# Patient Record
Sex: MALE | Race: WHITE | NOT HISPANIC OR LATINO
[De-identification: names, ages, dates, MRNs, and addresses within clinical notes are randomized per-mention and may not be internally consistent; named-entity substitution may affect disease eponyms.]

---

## 2017-03-07 PROBLEM — Z00.00 ENCOUNTER FOR PREVENTIVE HEALTH EXAMINATION: Noted: 2017-03-07

## 2017-03-08 ENCOUNTER — APPOINTMENT (OUTPATIENT)
Dept: OPHTHALMOLOGY | Facility: CLINIC | Age: 72
End: 2017-03-08

## 2017-03-08 ENCOUNTER — OUTPATIENT (OUTPATIENT)
Dept: OUTPATIENT SERVICES | Facility: HOSPITAL | Age: 72
LOS: 1 days | End: 2017-03-08

## 2017-03-09 DIAGNOSIS — H26.492 OTHER SECONDARY CATARACT, LEFT EYE: ICD-10-CM

## 2018-07-30 ENCOUNTER — APPOINTMENT (OUTPATIENT)
Dept: NEUROLOGY | Facility: CLINIC | Age: 73
End: 2018-07-30
Payer: MEDICARE

## 2018-07-30 VITALS
HEIGHT: 72 IN | BODY MASS INDEX: 25.47 KG/M2 | DIASTOLIC BLOOD PRESSURE: 60 MMHG | SYSTOLIC BLOOD PRESSURE: 124 MMHG | WEIGHT: 188 LBS

## 2018-07-30 PROCEDURE — 99205 OFFICE O/P NEW HI 60 MIN: CPT

## 2018-07-31 ENCOUNTER — APPOINTMENT (OUTPATIENT)
Dept: NEUROLOGY | Facility: CLINIC | Age: 73
End: 2018-07-31
Payer: MEDICARE

## 2018-07-31 PROCEDURE — 93040 RHYTHM ECG WITH REPORT: CPT

## 2018-07-31 PROCEDURE — 95819 EEG AWAKE AND ASLEEP: CPT

## 2018-09-20 ENCOUNTER — APPOINTMENT (OUTPATIENT)
Dept: NEUROLOGY | Facility: CLINIC | Age: 73
End: 2018-09-20
Payer: MEDICARE

## 2018-09-20 VITALS
DIASTOLIC BLOOD PRESSURE: 60 MMHG | SYSTOLIC BLOOD PRESSURE: 120 MMHG | WEIGHT: 190 LBS | HEIGHT: 72 IN | BODY MASS INDEX: 25.73 KG/M2

## 2018-09-20 PROCEDURE — 99215 OFFICE O/P EST HI 40 MIN: CPT

## 2018-11-29 ENCOUNTER — APPOINTMENT (OUTPATIENT)
Dept: NEUROLOGY | Facility: CLINIC | Age: 73
End: 2018-11-29
Payer: MEDICARE

## 2018-11-29 ENCOUNTER — TRANSCRIPTION ENCOUNTER (OUTPATIENT)
Age: 73
End: 2018-11-29

## 2018-11-29 VITALS
WEIGHT: 190 LBS | BODY MASS INDEX: 25.73 KG/M2 | SYSTOLIC BLOOD PRESSURE: 120 MMHG | DIASTOLIC BLOOD PRESSURE: 62 MMHG | HEIGHT: 72 IN

## 2018-11-29 PROCEDURE — 99214 OFFICE O/P EST MOD 30 MIN: CPT

## 2019-02-20 ENCOUNTER — APPOINTMENT (OUTPATIENT)
Dept: NEUROLOGY | Facility: CLINIC | Age: 74
End: 2019-02-20
Payer: MEDICARE

## 2019-02-20 VITALS
BODY MASS INDEX: 25.73 KG/M2 | SYSTOLIC BLOOD PRESSURE: 120 MMHG | WEIGHT: 190 LBS | DIASTOLIC BLOOD PRESSURE: 78 MMHG | HEIGHT: 72 IN

## 2019-02-20 DIAGNOSIS — I10 ESSENTIAL (PRIMARY) HYPERTENSION: ICD-10-CM

## 2019-02-20 PROCEDURE — 99214 OFFICE O/P EST MOD 30 MIN: CPT

## 2019-03-03 ENCOUNTER — TRANSCRIPTION ENCOUNTER (OUTPATIENT)
Age: 74
End: 2019-03-03

## 2019-06-05 ENCOUNTER — APPOINTMENT (OUTPATIENT)
Dept: NEUROLOGY | Facility: CLINIC | Age: 74
End: 2019-06-05
Payer: MEDICARE

## 2019-06-05 VITALS
WEIGHT: 190 LBS | HEIGHT: 72 IN | SYSTOLIC BLOOD PRESSURE: 120 MMHG | DIASTOLIC BLOOD PRESSURE: 70 MMHG | BODY MASS INDEX: 25.73 KG/M2

## 2019-06-05 PROCEDURE — 99214 OFFICE O/P EST MOD 30 MIN: CPT

## 2019-06-05 RX ORDER — MONTELUKAST 10 MG/1
10 TABLET, FILM COATED ORAL
Qty: 30 | Refills: 0 | Status: ACTIVE | COMMUNITY
Start: 2019-02-14

## 2019-07-23 ENCOUNTER — APPOINTMENT (OUTPATIENT)
Dept: NEUROLOGY | Facility: CLINIC | Age: 74
End: 2019-07-23
Payer: MEDICARE

## 2019-07-23 VITALS
BODY MASS INDEX: 26.41 KG/M2 | SYSTOLIC BLOOD PRESSURE: 140 MMHG | WEIGHT: 195 LBS | HEIGHT: 72 IN | DIASTOLIC BLOOD PRESSURE: 80 MMHG

## 2019-07-23 PROCEDURE — 99215 OFFICE O/P EST HI 40 MIN: CPT

## 2019-07-23 RX ORDER — ASPRIN AND EXTENDED-RELEASE DIPYRIDAMOLE 25; 200 MG/1; MG/1
25-200 CAPSULE ORAL TWICE DAILY
Qty: 60 | Refills: 5 | Status: DISCONTINUED | COMMUNITY
Start: 2018-07-30 | End: 2019-07-23

## 2019-08-19 ENCOUNTER — APPOINTMENT (OUTPATIENT)
Dept: NEUROLOGY | Facility: CLINIC | Age: 74
End: 2019-08-19
Payer: MEDICARE

## 2019-08-19 VITALS
BODY MASS INDEX: 26.41 KG/M2 | DIASTOLIC BLOOD PRESSURE: 70 MMHG | HEIGHT: 72 IN | SYSTOLIC BLOOD PRESSURE: 140 MMHG | WEIGHT: 195 LBS

## 2019-08-19 PROCEDURE — 99214 OFFICE O/P EST MOD 30 MIN: CPT

## 2019-11-14 ENCOUNTER — APPOINTMENT (OUTPATIENT)
Dept: NEUROLOGY | Facility: CLINIC | Age: 74
End: 2019-11-14
Payer: MEDICARE

## 2019-11-14 VITALS
HEART RATE: 67 BPM | BODY MASS INDEX: 27.36 KG/M2 | WEIGHT: 202 LBS | HEIGHT: 72 IN | DIASTOLIC BLOOD PRESSURE: 68 MMHG | SYSTOLIC BLOOD PRESSURE: 132 MMHG

## 2019-11-14 PROCEDURE — 93886 INTRACRANIAL COMPLETE STUDY: CPT

## 2019-11-14 PROCEDURE — 93892 TCD EMBOLI DETECT W/O INJ: CPT

## 2019-11-14 PROCEDURE — 93880 EXTRACRANIAL BILAT STUDY: CPT

## 2019-11-14 PROCEDURE — 99215 OFFICE O/P EST HI 40 MIN: CPT

## 2019-11-14 RX ORDER — AMLODIPINE BESYLATE 2.5 MG/1
2.5 TABLET ORAL
Qty: 90 | Refills: 0 | Status: DISCONTINUED | COMMUNITY
Start: 2018-12-13 | End: 2019-11-14

## 2019-11-14 RX ORDER — ATORVASTATIN CALCIUM 20 MG/1
20 TABLET, FILM COATED ORAL
Qty: 90 | Refills: 0 | Status: DISCONTINUED | COMMUNITY
Start: 2018-06-06 | End: 2019-11-14

## 2019-11-14 NOTE — REASON FOR VISIT
[Follow-Up: _____] : a [unfilled] follow-up visit [Consultation] : a consultation visit [FreeTextEntry1] : Chief complaint:recurrent cerebellar stroke

## 2019-11-14 NOTE — ASSESSMENT
[FreeTextEntry1] : Neurologically normal. Unfortunately he had another small cerebellar stroke which is now his third. He will remain on aspirin 325 mg and Plavix 75 mg daily. Followup in 3 months and by phone prior to that if needed. Lengthy discussion held with the patient and his wife.

## 2019-11-14 NOTE — PHYSICAL EXAM
[General Appearance - Alert] : alert [General Appearance - In No Acute Distress] : in no acute distress [Oriented To Time, Place, And Person] : oriented to person, place, and time [General Appearance - Well-Appearing] : healthy appearing [Impaired Insight] : insight and judgment were intact [Affect] : the affect was normal [Person] : oriented to person [Memory Recent] : recent memory was not impaired [Place] : oriented to place [Concentration Intact] : normal concentrating ability [Time] : oriented to time [Fluency] : fluency intact [Comprehension] : comprehension intact [Cranial Nerves Optic (II)] : visual acuity intact bilaterally,  visual fields full to confrontation, pupils equal round and reactive to light [Cranial Nerves Oculomotor (III)] : extraocular motion intact [Cranial Nerves Trigeminal (V)] : facial sensation intact symmetrically [Cranial Nerves Vestibulocochlear (VIII)] : hearing was intact bilaterally [Cranial Nerves Facial (VII)] : face symmetrical [Cranial Nerves Glossopharyngeal (IX)] : tongue and palate midline [Cranial Nerves Accessory (XI - Cranial And Spinal)] : head turning and shoulder shrug symmetric [Motor Tone] : muscle tone was normal in all four extremities [Cranial Nerves Hypoglossal (XII)] : there was no tongue deviation with protrusion [Motor Strength] : muscle strength was normal in all four extremities [Sensation Tactile Decrease] : light touch was intact [No Muscle Atrophy] : normal bulk in all four extremities [Sensation Pain / Temperature Decrease] : pain and temperature was intact [Abnormal Walk] : normal gait [Balance] : balance was intact [2+] : Ankle jerk left 2+ [Edema] : there was no peripheral edema [Romberg's Sign] : Romberg's sign was negtive [Past-pointing] : there was no past-pointing [Tremor] : no tremor present [Coordination - Dysmetria Impaired Finger-to-Nose Bilateral] : not present [Plantar Reflex Right Only] : normal on the right [Coordination - Dysmetria Impaired Heel-to-Shin Bilateral] : not present [Plantar Reflex Left Only] : normal on the left

## 2019-11-14 NOTE — HISTORY OF PRESENT ILLNESS
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 He does not fall but gets incapacitated to the ground. 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 His Hb was 7.0 ; 2.5 years a go. Colonoscopy was negative. 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 3 months ago he started to develop episodes where it appears as if the floor is buckling and he feels drunk. He becomes lightheaded and nearly passes out. He has to lay down immediately to prevent himself from passing out. He had 4 of these episodes and was admitted to [facility] May 30 through June 1. Imaging studies which are discussed further below showed an acute left cerebellar infarct. He was discharged on aspirin and Lipitor. He was symptom free until about a week ago when he again started to develop similar episodes, however this time the floor did not buckle. He was readmitted to the hospital where a new MRI showed a recent infarct in the right medial cerebellum. Of note he had a CANDY and a loop recorder placed. He had an additional episode last night but did not go to the hospital this time.\par \par We put him on Aggrenox twice a day. After I saw him I actually reviewed the images of his MRIs and concurred with the impressions that there was an initial cerebellar stroke followed by a subsequent cerebellar stroke in the opposite side. Tilt table test was negative. He saw hematologist and had a negative hypercoagulable workup. He has a loop recorder.\par \par He was doing well until 9/18/18.  After eating a very heavy meal prior to fasting he had another episode although not quite as severe or prolonged than the others. He believes it was partially precipitated by him wearing a very tight shirt around the neck.\par \par On 10/9/18 he had another episode that was different from prior ones. This was more of a lightheaded sensation And generalized weakness. It lasted about 8 hours. He was in the hospital under observation. Repeat brain MRI showed no change. Blood pressure medications were reduced. He remains on Aggrenox. He had been fine since.\par \par  Risk factors including blood pressure and cholesterol have been under excellent control.\par \par He had been doing well, continuing Aggrenox. 6/4/19  he had another spell. He became weak, lightheaded and diaphoretic. He did not pass out. It resolved after about 30 minutes.\par \par Brain MRI at that time showed nothing acute. Interrogation of the loop recorder was unremarkable.\par \par On 7/16/19 had another episode of lightheadedness. He went to the hospital. Brain MRI at that time showed a new left cerebellar infarct. MRA of the head and neck was negative. The loop recorder has shown no arrhythmia. EEG was normal. Cholesterol and LDL have been fine. We then switched him to aspirin 325 mg a day and Plavix 75 mg a day. He has remained symptom free. He is having no problem with the medication.

## 2019-11-14 NOTE — DISCUSSION/SUMMARY
[Antithrombotic therapy with ___] : antithrombotic therapy with  [unfilled] [Intensive Blood Pressure Control] : intensive blood pressure control [Patient encouraged to discuss with Primary MD] : I encouraged the patient to discuss these important issues with ~his/her~ primary care doctor [Lipid Lowering Therapy] : lipid lowering therapy [Goals and Counseling] : I have reviewed the goals of stroke risk factor modification. I counseled the patient on measures to reduce stroke risk, including the importance of medication compliance, risk factor control, exercise, healthy diet and avoidance of smoking. I reviewed stroke warning signs and symptoms and appropriate actions to take if such occur. [FreeTextEntry1] : Recurrent  cerebellar infarcts, based on review of reports and review of records it is likely that possible etiology of these infarctions are artery to artery embolism versus small vessel disease.\par \par since imaging was performed outside of [facility], I do not have access to it, however is working on getting the discs significantly reviewed in our PACS\par \par He has had no evidence on his loop recorder since it was placed and seemed to be no major issues which are pointing towards cardioembolic etiology.\par \par I discussed in detail his secondary stroke prevention plan of being on double antiplatelet therapy with aspirin and Plavix 75 mg, at the same time keep a close eye on any GI issues such as occult blood in stools or drop in hemoglobin, at which time change of therapy would be necessary. \par At this point when there was no clear etiology identified I will continue DASA and Plavix at treatment for at least 6 months, for which an MRI brainto look for any sudden pain and function. If there are any recurrent episodes or sudden pain and felt at that time we will consider anticoagulation with eliquis and discontinuing double antiplatelet treatment.\par

## 2019-11-14 NOTE — DATA REVIEWED
[de-identified] : MRI of the brain dated 7/16/19 showed a new Cerebellar infarct. MRA of the head and neck was negative.

## 2019-11-18 ENCOUNTER — APPOINTMENT (OUTPATIENT)
Dept: NEUROLOGY | Facility: CLINIC | Age: 74
End: 2019-11-18
Payer: MEDICARE

## 2019-11-18 VITALS
DIASTOLIC BLOOD PRESSURE: 70 MMHG | HEIGHT: 72 IN | WEIGHT: 192 LBS | BODY MASS INDEX: 26.01 KG/M2 | SYSTOLIC BLOOD PRESSURE: 128 MMHG

## 2019-11-18 PROCEDURE — 99214 OFFICE O/P EST MOD 30 MIN: CPT

## 2020-01-07 ENCOUNTER — APPOINTMENT (OUTPATIENT)
Dept: NEUROLOGY | Facility: CLINIC | Age: 75
End: 2020-01-07
Payer: MEDICARE

## 2020-01-07 VITALS
HEIGHT: 72 IN | WEIGHT: 195 LBS | SYSTOLIC BLOOD PRESSURE: 122 MMHG | DIASTOLIC BLOOD PRESSURE: 70 MMHG | BODY MASS INDEX: 26.41 KG/M2

## 2020-01-07 PROCEDURE — 99214 OFFICE O/P EST MOD 30 MIN: CPT

## 2020-01-07 NOTE — HISTORY OF PRESENT ILLNESS
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  3 months ago he started to develop episodes where it appears as if the floor is buckling and he feels drunk. He becomes lightheaded and nearly passes out. He has to lay down immediately to prevent himself from passing out. He had 4 of these episodes and was admitted to [facility] May 30 through June 1. Imaging studies which are discussed further below showed an acute left cerebellar infarct. He was discharged on aspirin and Lipitor. He was symptom free until about a week ago when he again started to develop similar episodes, however this time the floor did not buckle. He was readmitted to the hospital where a new MRI showed a recent infarct in the right medial cerebellum. Of note he had a CANDY and a loop recorder placed. He had an additional episode last night but did not go to the hospital this time.\par \par We put him on Aggrenox twice a day. After I saw him I actually reviewed the images of his MRIs and concurred with the impressions that there was an initial cerebellar stroke followed by a subsequent cerebellar stroke in the opposite side. Tilt table test was negative. He saw hematologist and had a negative hypercoagulable workup. He has a loop recorder.\par \par He was doing well until 9/18/18.  After eating a very heavy meal prior to fasting he had another episode although not quite as severe or prolonged than the others. He believes it was partially precipitated by him wearing a very tight shirt around the neck.\par \par On 10/9/18 he had another episode that was different from prior ones. This was more of a lightheaded sensation And generalized weakness. It lasted about 8 hours. He was in the hospital under observation. Repeat brain MRI showed no change. Blood pressure medications were reduced. He remains on Aggrenox. He had been fine since.\par \par  Risk factors including blood pressure and cholesterol have been under excellent control.\par \par He had been doing well, continuing Aggrenox. 6/4/19  he had another spell. He became weak, lightheaded and diaphoretic. He did not pass out. It resolved after about 30 minutes.\par \par Brain MRI at that time showed nothing acute. Interrogation of the loop recorder was unremarkable.\par \par On 7/16/19 had another episode of lightheadedness. He went to the hospital. Brain MRI at that time showed a new left cerebellar infarct. MRA of the head and neck was negative. The loop recorder has shown no arrhythmia. EEG was normal. Cholesterol and LDL have been fine. We then switched him to aspirin initially 325 mg a day and Plavix 75 mg a day. He has remained symptom free. He is having no problem with the medication. Aspirin was reduced to 81 mg a day.\par \par He   sought second opinions from Dr. Salcedo and Dr. Anderson. No changes were recommended. He says he has had  guaiac positive stools, but on recent testing they were negative. . Endoscopy and colonoscopy are on hold. No further neurological events.

## 2020-01-07 NOTE — PHYSICAL EXAM
[General Appearance - Alert] : alert [General Appearance - In No Acute Distress] : in no acute distress [General Appearance - Well-Appearing] : healthy appearing [Oriented To Time, Place, And Person] : oriented to person, place, and time [Impaired Insight] : insight and judgment were intact [Affect] : the affect was normal [Person] : oriented to person [Memory Recent] : recent memory was not impaired [Time] : oriented to time [Place] : oriented to place [Fluency] : fluency intact [Concentration Intact] : normal concentrating ability [Cranial Nerves Optic (II)] : visual acuity intact bilaterally,  visual fields full to confrontation, pupils equal round and reactive to light [Comprehension] : comprehension intact [Cranial Nerves Oculomotor (III)] : extraocular motion intact [Cranial Nerves Trigeminal (V)] : facial sensation intact symmetrically [Cranial Nerves Facial (VII)] : face symmetrical [Cranial Nerves Vestibulocochlear (VIII)] : hearing was intact bilaterally [Cranial Nerves Accessory (XI - Cranial And Spinal)] : head turning and shoulder shrug symmetric [Cranial Nerves Glossopharyngeal (IX)] : tongue and palate midline [Motor Tone] : muscle tone was normal in all four extremities [Cranial Nerves Hypoglossal (XII)] : there was no tongue deviation with protrusion [Motor Strength] : muscle strength was normal in all four extremities [No Muscle Atrophy] : normal bulk in all four extremities [Sensation Tactile Decrease] : light touch was intact [Sensation Pain / Temperature Decrease] : pain and temperature was intact [Romberg's Sign] : Romberg's sign was negtive [Past-pointing] : there was no past-pointing [Balance] : balance was intact [Abnormal Walk] : normal gait [Coordination - Dysmetria Impaired Finger-to-Nose Bilateral] : not present [Tremor] : no tremor present [Coordination - Dysmetria Impaired Heel-to-Shin Bilateral] : not present [2+] : Ankle jerk right 2+ [Plantar Reflex Right Only] : normal on the right [Plantar Reflex Left Only] : normal on the left [Edema] : there was no peripheral edema

## 2020-01-07 NOTE — CONSULT LETTER
[Dear  ___] : Dear  [unfilled], [Consult Letter:] : I had the pleasure of evaluating your patient, [unfilled]. [Consult Closing:] : Thank you very much for allowing me to participate in the care of this patient.  If you have any questions, please do not hesitate to contact me. [Please see my note below.] : Please see my note below. [FreeTextEntry3] : Bandar Iyer MD, PhD, DPN-N\par [facility]\par Neurology at [place]\par Chief, Division of Neurology\par [facility]\par  [Sincerely,] : Sincerely,

## 2020-01-07 NOTE — DATA REVIEWED
[de-identified] : MRI of the brain dated 7/16/19 showed a new Cerebellar infarct. MRA of the head and neck was negative.

## 2020-01-07 NOTE — ASSESSMENT
[FreeTextEntry1] : Neurologically normal. He has had 3 strokes but none since July 2019 when we added Plavix. He has had 2 additional opinions and they recommended no significant changes.He will continue aspirin 81 mg a day in addition to Plavix 75 mg a day. Followup in 4-5 months, sooner should the need arise.

## 2020-04-20 ENCOUNTER — APPOINTMENT (OUTPATIENT)
Dept: NEUROLOGY | Facility: CLINIC | Age: 75
End: 2020-04-20
Payer: MEDICARE

## 2020-04-20 PROCEDURE — 99213 OFFICE O/P EST LOW 20 MIN: CPT | Mod: 95

## 2020-04-20 NOTE — HISTORY OF PRESENT ILLNESS
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  3 months ago he started to develop episodes where it appears as if the floor is buckling and he feels drunk. He becomes lightheaded and nearly passes out. He has to lay down immediately to prevent himself from passing out. He had 4 of these episodes and was admitted to [facility] May 30 through June 1. Imaging studies which are discussed further below showed an acute left cerebellar infarct. He was discharged on aspirin and Lipitor. He was symptom free until about a week ago when he again started to develop similar episodes, however this time the floor did not buckle. He was readmitted to the hospital where a new MRI showed a recent infarct in the right medial cerebellum. Of note he had a CANDY and a loop recorder placed. He had an additional episode last night but did not go to the hospital this time.\par \par We put him on Aggrenox twice a day. After I saw him I actually reviewed the images of his MRIs and concurred with the impressions that there was an initial cerebellar stroke followed by a subsequent cerebellar stroke in the opposite side. Tilt table test was negative. He saw hematologist and had a negative hypercoagulable workup. He has a loop recorder.\par \par He was doing well until 9/18/18.  After eating a very heavy meal prior to fasting he had another episode although not quite as severe or prolonged than the others. He believes it was partially precipitated by him wearing a very tight shirt around the neck.\par \par On 10/9/18 he had another episode that was different from prior ones. This was more of a lightheaded sensation And generalized weakness. It lasted about 8 hours. He was in the hospital under observation. Repeat brain MRI showed no change. Blood pressure medications were reduced. He remains on Aggrenox. He had been fine since.\par \par  Risk factors including blood pressure and cholesterol have been under excellent control.\par \par He had been doing well, continuing Aggrenox. 6/4/19  he had another spell. He became weak, lightheaded and diaphoretic. He did not pass out. It resolved after about 30 minutes.\par \par Brain MRI at that time showed nothing acute. Interrogation of the loop recorder was unremarkable.\par \par On 7/16/19 had another episode of lightheadedness. He went to the hospital. Brain MRI at that time showed a new left cerebellar infarct. MRA of the head and neck was negative. The loop recorder has shown no arrhythmia. EEG was normal. Cholesterol and LDL have been fine. We then switched him to aspirin initially 325 mg a day and Plavix 75 mg a day. He has remained symptom free. He is having no problem with the medication. Aspirin was reduced to 81 mg a day.\par \par He   sought second opinions from Dr. Salcedo and Dr. Anderson. No changes were recommended. He says he has had  guaiac positive stools, but on recent testing they were negative. . Endoscopy and colonoscopy are on hold. \par \par Early March he developed mild fever and cough. Him and his wife tested positive for Covid 19. Symptoms remained mild. Quarantined at home. Had 2 episodes of confusion during this. One occasion he couldn't do simple math, the other he didn't recognize his wife briefly upon awakening. Other than these episodes he has been fine.

## 2020-04-20 NOTE — CONSULT LETTER
[Dear  ___] : Dear  [unfilled], [Consult Letter:] : I had the pleasure of evaluating your patient, [unfilled]. [Please see my note below.] : Please see my note below. [Sincerely,] : Sincerely, [Consult Closing:] : Thank you very much for allowing me to participate in the care of this patient.  If you have any questions, please do not hesitate to contact me. [FreeTextEntry3] : Bandar Iyer MD, PhD, DPN-N\par [facility]\par Neurology at [place]\par Chief, Division of Neurology\par [facility]\par

## 2020-04-20 NOTE — DATA REVIEWED
[de-identified] : MRI of the brain dated 7/16/19 showed a new Cerebellar infarct. MRA of the head and neck was negative.

## 2020-04-20 NOTE — REASON FOR VISIT
[Home] : at home, [unfilled] , at the time of the visit. [Other Location: e.g. 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

## 2020-06-24 ENCOUNTER — APPOINTMENT (OUTPATIENT)
Dept: NEUROLOGY | Facility: CLINIC | Age: 75
End: 2020-06-24
Payer: MEDICARE

## 2020-06-24 VITALS — HEIGHT: 72 IN | WEIGHT: 192 LBS | BODY MASS INDEX: 26.01 KG/M2

## 2020-06-24 PROCEDURE — 99214 OFFICE O/P EST MOD 30 MIN: CPT

## 2020-06-24 RX ORDER — ASPIRIN ENTERIC COATED TABLETS 81 MG 81 MG/1
81 TABLET, DELAYED RELEASE ORAL DAILY
Refills: 0 | Status: ACTIVE | COMMUNITY

## 2020-07-13 ENCOUNTER — APPOINTMENT (OUTPATIENT)
Dept: NEUROLOGY | Facility: CLINIC | Age: 75
End: 2020-07-13
Payer: MEDICARE

## 2020-07-13 VITALS
DIASTOLIC BLOOD PRESSURE: 80 MMHG | BODY MASS INDEX: 25.73 KG/M2 | WEIGHT: 190 LBS | SYSTOLIC BLOOD PRESSURE: 125 MMHG | TEMPERATURE: 98.1 F | HEIGHT: 72 IN

## 2020-07-13 PROCEDURE — 99215 OFFICE O/P EST HI 40 MIN: CPT

## 2020-08-12 ENCOUNTER — APPOINTMENT (OUTPATIENT)
Dept: NEUROLOGY | Facility: CLINIC | Age: 75
End: 2020-08-12

## 2020-09-24 ENCOUNTER — APPOINTMENT (OUTPATIENT)
Dept: NEUROLOGY | Facility: CLINIC | Age: 75
End: 2020-09-24
Payer: MEDICARE

## 2020-09-24 VITALS
BODY MASS INDEX: 26.41 KG/M2 | SYSTOLIC BLOOD PRESSURE: 120 MMHG | DIASTOLIC BLOOD PRESSURE: 70 MMHG | HEIGHT: 72 IN | WEIGHT: 195 LBS | TEMPERATURE: 97.8 F

## 2020-09-24 PROCEDURE — 99214 OFFICE O/P EST MOD 30 MIN: CPT

## 2021-01-26 ENCOUNTER — APPOINTMENT (OUTPATIENT)
Dept: NEUROLOGY | Facility: CLINIC | Age: 76
End: 2021-01-26
Payer: MEDICARE

## 2021-01-26 VITALS
SYSTOLIC BLOOD PRESSURE: 120 MMHG | WEIGHT: 195 LBS | BODY MASS INDEX: 26.41 KG/M2 | DIASTOLIC BLOOD PRESSURE: 75 MMHG | TEMPERATURE: 97.3 F | HEIGHT: 72 IN

## 2021-01-26 PROCEDURE — 99214 OFFICE O/P EST MOD 30 MIN: CPT

## 2021-01-26 RX ORDER — AMLODIPINE BESYLATE 5 MG/1
5 TABLET ORAL DAILY
Refills: 0 | Status: DISCONTINUED | COMMUNITY
End: 2021-01-26

## 2021-06-15 ENCOUNTER — APPOINTMENT (OUTPATIENT)
Dept: NEUROLOGY | Facility: CLINIC | Age: 76
End: 2021-06-15
Payer: MEDICARE

## 2021-06-15 VITALS
TEMPERATURE: 97.3 F | SYSTOLIC BLOOD PRESSURE: 120 MMHG | DIASTOLIC BLOOD PRESSURE: 76 MMHG | BODY MASS INDEX: 26.68 KG/M2 | HEIGHT: 72 IN | WEIGHT: 197 LBS

## 2021-06-15 DIAGNOSIS — I63.9 CEREBRAL INFARCTION, UNSPECIFIED: ICD-10-CM

## 2021-06-15 PROCEDURE — 99214 OFFICE O/P EST MOD 30 MIN: CPT

## 2021-08-26 ENCOUNTER — APPOINTMENT (OUTPATIENT)
Dept: PULMONOLOGY | Facility: CLINIC | Age: 76
End: 2021-08-26
Payer: MEDICARE

## 2021-08-26 VITALS — BODY MASS INDEX: 25.73 KG/M2 | WEIGHT: 190 LBS | HEIGHT: 72 IN

## 2021-08-26 VITALS
DIASTOLIC BLOOD PRESSURE: 64 MMHG | OXYGEN SATURATION: 95 % | RESPIRATION RATE: 16 BRPM | HEART RATE: 68 BPM | SYSTOLIC BLOOD PRESSURE: 118 MMHG

## 2021-08-26 DIAGNOSIS — Z86.73 PERSONAL HISTORY OF TRANSIENT ISCHEMIC ATTACK (TIA), AND CEREBRAL INFARCTION W/OUT RESIDUAL DEFICITS: ICD-10-CM

## 2021-08-26 DIAGNOSIS — K21.9 GASTRO-ESOPHAGEAL REFLUX DISEASE W/OUT ESOPHAGITIS: ICD-10-CM

## 2021-08-26 DIAGNOSIS — Z86.39 PERSONAL HISTORY OF OTHER ENDOCRINE, NUTRITIONAL AND METABOLIC DISEASE: ICD-10-CM

## 2021-08-26 DIAGNOSIS — Z78.9 OTHER SPECIFIED HEALTH STATUS: ICD-10-CM

## 2021-08-26 DIAGNOSIS — R05 COUGH: ICD-10-CM

## 2021-08-26 PROCEDURE — 99204 OFFICE O/P NEW MOD 45 MIN: CPT

## 2021-08-26 RX ORDER — PROMETHAZINE HYDROCHLORIDE AND CODEINE PHOSPHATE 6.25; 1 MG/5ML; MG/5ML
6.25-1 SOLUTION ORAL
Qty: 240 | Refills: 0 | Status: DISCONTINUED | COMMUNITY
Start: 2019-04-24 | End: 2021-08-26

## 2021-08-26 RX ORDER — CLARITHROMYCIN 500 MG/1
500 TABLET, FILM COATED, EXTENDED RELEASE ORAL
Qty: 20 | Refills: 0 | Status: DISCONTINUED | COMMUNITY
Start: 2018-12-13 | End: 2021-08-26

## 2021-08-26 RX ORDER — SODIUM SULFATE, POTASSIUM SULFATE, MAGNESIUM SULFATE 17.5; 3.13; 1.6 G/ML; G/ML; G/ML
17.5-3.13-1.6 SOLUTION, CONCENTRATE ORAL
Qty: 354 | Refills: 0 | Status: DISCONTINUED | COMMUNITY
Start: 2017-12-11 | End: 2021-08-26

## 2021-08-26 RX ORDER — LEVOFLOXACIN 500 MG/1
500 TABLET, FILM COATED ORAL
Qty: 14 | Refills: 0 | Status: DISCONTINUED | COMMUNITY
Start: 2019-04-24 | End: 2021-08-26

## 2021-08-26 NOTE — DISCUSSION/SUMMARY
[FreeTextEntry1] : \par #1. Will schedule PFTs in near future to assess lung function \par #2. The patient does not appear to require chronic BD therapy at this time\par #3. Diet and exercise for weight loss\par #4. SOBOE is likely related to weight or deconditioning\par #5. Recent CXR with atelectasis; will check chest CT given persistent cough\par #6. Continue PPI/H2B for GERD and Flonase nasal spray for post nasal drip as needed \par #7. Trial of prednisone for cough\par #8. Prior ENT evaluation was reportedly negative\par #9. Pt had both Covid vaccines\par #10. F/u in one month with chest CT and PFTs\par #11. Reviewed risks of exposure and symptoms of Covid-19 virus, including how the virus is spread and precautions to avoid shaylee virus.\par \par Patient's questions were answered and patient appears to understand these recommendations

## 2021-08-26 NOTE — HISTORY OF PRESENT ILLNESS
[Never] : never [TextBox_4] : Pt reports a long h/o cough for which he was seen in our office in 5/2016 but w/u was not diagnostic. \par Pt reports a Covid infection in 3-4/2020 when he developed worsening cough, low grade fevers, fatigue, body aches. He was treated with Plaquenil and Zithromax. He recovered one month later but with persistent, residual cough similar to before the infection.\par He has been treated with Flonase, H2B/PPI, Tessalon Perles but without resolution of cough though reports some improvement with Tessalon Perles. Prior ENT evaluation was not diagnostic per pt.\par  [On ___] : performed on [unfilled] [Patient] : the patient [Indication ___] : for an indication of [unfilled] [None] : no new symptoms reported [FreeTextEntry9] : Chest CT [FreeTextEntry8] : b/l atelectasis

## 2021-08-26 NOTE — PHYSICAL EXAM
[No Acute Distress] : no acute distress [Well Nourished] : well nourished [Well Developed] : well developed [Low Lying Soft Palate] : low lying soft palate [Enlarged Base of the Tongue] : enlarged base of the tongue [II] : Mallampati Class: II [Normal Appearance] : normal appearance [Supple] : supple [Normal Rate/Rhythm] : normal rate/rhythm [Normal S1, S2] : normal s1, s2 [No Murmurs] : no murmurs [No Resp Distress] : no resp distress [No Acc Muscle Use] : no acc muscle use [Normal Rhythm and Effort] : normal rhythm and effort [Clear to Auscultation Bilaterally] : clear to auscultation bilaterally [No Abnormalities] : no abnormalities [Benign] : benign [Not Tender] : not tender [Soft] : soft [No Clubbing] : no clubbing [No Edema] : no edema [No Focal Deficits] : no focal deficits [Oriented x3] : oriented x3 [TextBox_11] : Moderate oropharyngeal crowding.

## 2021-08-26 NOTE — REVIEW OF SYSTEMS
[Fever] : no fever [Chills] : no chills [Nasal Congestion] : no nasal congestion [Sinus Problems] : no sinus problems

## 2021-08-26 NOTE — REASON FOR VISIT
[Initial] : an initial visit [Abnormal CXR/ Chest CT] : an abnormal CXR/ chest CT [Cough] : cough [TextBox_44] : atelectasis on imaging studies

## 2021-08-26 NOTE — CONSULT LETTER
[Dear  ___] : Dear  [unfilled], [Consult Letter:] : I had the pleasure of evaluating your patient, [unfilled]. [Please see my note below.] : Please see my note below. [Consult Closing:] : Thank you very much for allowing me to participate in the care of this patient.  If you have any questions, please do not hesitate to contact me. [Sincerely,] : Sincerely, [FreeTextEntry3] : Rafael Araiza MD, FCCP, D. ABSM\par Pulmonary and Sleep Medicine\par [facility]monary [facility] Sleep Medicine at [place]

## 2021-09-25 DIAGNOSIS — Z01.818 ENCOUNTER FOR OTHER PREPROCEDURAL EXAMINATION: ICD-10-CM

## 2021-09-27 ENCOUNTER — APPOINTMENT (OUTPATIENT)
Dept: DISASTER EMERGENCY | Facility: CLINIC | Age: 76
End: 2021-09-27

## 2021-09-28 LAB — SARS-COV-2 N GENE NPH QL NAA+PROBE: NOT DETECTED

## 2021-10-01 ENCOUNTER — APPOINTMENT (OUTPATIENT)
Dept: PULMONOLOGY | Facility: CLINIC | Age: 76
End: 2021-10-01
Payer: MEDICARE

## 2021-10-01 VITALS
SYSTOLIC BLOOD PRESSURE: 137 MMHG | DIASTOLIC BLOOD PRESSURE: 79 MMHG | WEIGHT: 200 LBS | OXYGEN SATURATION: 93 % | BODY MASS INDEX: 28.31 KG/M2 | HEART RATE: 70 BPM | HEIGHT: 70.5 IN

## 2021-10-01 VITALS — WEIGHT: 200 LBS | HEIGHT: 70.5 IN | BODY MASS INDEX: 28.31 KG/M2

## 2021-10-01 DIAGNOSIS — R09.82 POSTNASAL DRIP: ICD-10-CM

## 2021-10-01 DIAGNOSIS — R91.1 SOLITARY PULMONARY NODULE: ICD-10-CM

## 2021-10-01 DIAGNOSIS — E66.3 OVERWEIGHT: ICD-10-CM

## 2021-10-01 DIAGNOSIS — J98.11 ATELECTASIS: ICD-10-CM

## 2021-10-01 PROCEDURE — 99214 OFFICE O/P EST MOD 30 MIN: CPT

## 2021-10-01 RX ORDER — PREDNISONE 10 MG/1
10 TABLET ORAL
Qty: 50 | Refills: 0 | Status: DISCONTINUED | COMMUNITY
Start: 2021-08-26 | End: 2021-10-01

## 2021-10-01 NOTE — DISCUSSION/SUMMARY
[FreeTextEntry1] : \par #1. PFTs performed today are essentially normal though reduced from previous\par #2. The patient does not appear to require chronic BD therapy at this time\par #3. SOBOE is likely related to weight or deconditioning given normal PFTs\par #4. Diet and exercise for weight loss \par #5. Chest CT as above; will obtain PET CT to evaluate RLL 2 cm opacity though may be rounded atelectasis\par #6. Continue PPI/H2B for GERD and Flonase nasal spray for post nasal drip as needed \par #7. S/p trial of prednisone for cough with improvement\par #8. Prior ENT evaluation was reportedly negative\par #9. Pt had both Covid vaccines\par #10. F/u in one month with PET CT; consider thoracic surgery evaluation for any significant uptake\par #11. Reviewed risks of exposure and symptoms of Covid-19 virus, including how the virus is spread and precautions to avoid shaylee virus.\par \par Patient's questions were answered and patient appears to understand these recommendations

## 2021-10-01 NOTE — REASON FOR VISIT
[Abnormal CXR/ Chest CT] : an abnormal CXR/ chest CT [Cough] : cough [Follow-Up] : a follow-up visit [Pulmonary Nodules] : pulmonary nodules [TextBox_44] : atelectasis on imaging studies

## 2021-10-01 NOTE — PROCEDURE
[FreeTextEntry1] : PFTs 5/13/16 - essentially normal\par PFTs 10/121 - essentially normal though reduced from previous

## 2021-10-01 NOTE — HISTORY OF PRESENT ILLNESS
[Never] : never [On ___] : performed on [unfilled] [Patient] : the patient [Indication ___] : for an indication of [unfilled] [None] : no new symptoms reported [TextBox_4] : Pt reports a long h/o cough for which he was seen in our office in 5/2016 but w/u was not diagnostic. \par Pt reports a Covid infection in 3-4/2020 when he developed worsening cough, low grade fevers, fatigue, body aches. He was treated with Plaquenil and Zithromax. He recovered one month later but with persistent, residual cough similar to before the infection.\par He has been treated with Flonase, H2B/PPI, Tessalon Perles but without resolution of cough though reports some improvement with Tessalon Perles. Prior ENT evaluation was not diagnostic per pt.\par  [FreeTextEntry9] : Chest CT [FreeTextEntry8] : b/l atelectasis

## 2021-10-01 NOTE — CONSULT LETTER
[Dear  ___] : Dear  [unfilled], [Consult Letter:] : I had the pleasure of evaluating your patient, [unfilled]. [Please see my note below.] : Please see my note below. [Consult Closing:] : Thank you very much for allowing me to participate in the care of this patient.  If you have any questions, please do not hesitate to contact me. [Sincerely,] : Sincerely, [FreeTextEntry3] : Rafael Araiza MD, FCCP, D. ABSM\par Pulmonary and Sleep Medicine\par [facility] at [place]

## 2021-10-20 ENCOUNTER — NON-APPOINTMENT (OUTPATIENT)
Age: 76
End: 2021-10-20

## 2021-11-10 ENCOUNTER — APPOINTMENT (OUTPATIENT)
Dept: PULMONOLOGY | Facility: CLINIC | Age: 76
End: 2021-11-10

## 2022-06-15 ENCOUNTER — APPOINTMENT (OUTPATIENT)
Dept: NEUROLOGY | Facility: CLINIC | Age: 77
End: 2022-06-15
Payer: MEDICARE

## 2022-06-15 VITALS
SYSTOLIC BLOOD PRESSURE: 134 MMHG | HEIGHT: 70 IN | DIASTOLIC BLOOD PRESSURE: 80 MMHG | BODY MASS INDEX: 27.2 KG/M2 | WEIGHT: 190 LBS

## 2022-06-15 PROCEDURE — 99214 OFFICE O/P EST MOD 30 MIN: CPT

## 2022-06-15 NOTE — CONSULT LETTER
[Dear  ___] : Dear  [unfilled], [Consult Letter:] : I had the pleasure of evaluating your patient, [unfilled]. [Please see my note below.] : Please see my note below. [Consult Closing:] : Thank you very much for allowing me to participate in the care of this patient.  If you have any questions, please do not hesitate to contact me. [Sincerely,] : Sincerely, [FreeTextEntry3] : Bandar Iyer MD, PhD, DPN-N\par [facility]\par Neurology at [place]\par Chief, Division of Neurology\par [facility]\par

## 2022-06-15 NOTE — ASSESSMENT
[FreeTextEntry1] : Neurologically normal. He has had 3 strokes but none since July 2019 when we added Plavix. He has had 2 additional opinions and they recommended no significant changes.He will continue aspirin 81 mg a day in addition to Plavix. \par \par History given by patient and his wife indicate some cognitive impairment.  This was not demonstrable in the office today.  I am recommending an updated brain MRI, and EEG and formal neuropsychological evaluation.  He may be a candidate for one of the centrally acting cholinesterase inhibitors once the work-up is completed.\par I have explained in great detail that currently available medications work to help slow down further memory decline rather than reverse existing memory loss and they seem to understand this.\par \par

## 2022-06-15 NOTE — PHYSICAL EXAM
[General Appearance - Alert] : alert [General Appearance - In No Acute Distress] : in no acute distress [General Appearance - Well-Appearing] : healthy appearing [Oriented To Time, Place, And Person] : oriented to person, place, and time [Impaired Insight] : insight and judgment were intact [Affect] : the affect was normal [Memory Recent] : recent memory was not impaired [Person] : oriented to person [Place] : oriented to place [Time] : oriented to time [Short Term Intact] : short term memory intact [Remote Intact] : remote memory intact [Registration Intact] : recent registration memory intact [Concentration Intact] : normal concentrating ability [Fluency] : fluency intact [Comprehension] : comprehension intact [Current Events] : adequate knowledge of current events [Past History] : adequate knowledge of personal past history [Cranial Nerves Optic (II)] : visual acuity intact bilaterally,  visual fields full to confrontation, pupils equal round and reactive to light [Cranial Nerves Oculomotor (III)] : extraocular motion intact [Cranial Nerves Trigeminal (V)] : facial sensation intact symmetrically [Cranial Nerves Facial (VII)] : face symmetrical [Cranial Nerves Vestibulocochlear (VIII)] : hearing was intact bilaterally [Motor Tone] : muscle tone was normal in all four extremities [Motor Strength] : muscle strength was normal in all four extremities [No Muscle Atrophy] : normal bulk in all four extremities [Paresis Pronator Drift Right-Sided] : no pronator drift on the right [Paresis Pronator Drift Left-Sided] : no pronator drift on the left [Sensation Tactile Decrease] : light touch was intact [Sensation Pain / Temperature Decrease] : pain and temperature was intact [Balance] : balance was intact [Abnormal Walk] : normal gait [Past-pointing] : there was no past-pointing [Tremor] : no tremor present [Coordination - Dysmetria Impaired Finger-to-Nose Bilateral] : not present [Coordination - Dysmetria Impaired Heel-to-Shin Bilateral] : not present [2+] : Ankle jerk left 2+ [Plantar Reflex Right Only] : normal on the right [Plantar Reflex Left Only] : normal on the left [FreeTextEntry4] : Short-term recall 3/3.  Names president and  with mild hesitation [PERRL With Normal Accommodation] : pupils were equal in size, round, reactive to light, with normal accommodation [Edema] : there was no peripheral edema

## 2022-06-15 NOTE — HISTORY OF PRESENT ILLNESS
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  3 months ago he started to develop episodes where it appears as if the floor is buckling and he feels drunk. He becomes lightheaded and nearly passes out. He has to lay down immediately to prevent himself from passing out. He had 4 of these episodes and was admitted to [facility] May 30 through June 1. Imaging studies which are discussed further below showed an acute left cerebellar infarct. He was discharged on aspirin and Lipitor. He was symptom free until about a week ago when he again started to develop similar episodes, however this time the floor did not buckle. He was readmitted to the hospital where a new MRI showed a recent infarct in the right medial cerebellum. Of note he had a CANDY and a loop recorder placed. He had an additional episode last night but did not go to the hospital this time.\par \par We put him on Aggrenox twice a day. After I saw him I actually reviewed the images of his MRIs and concurred with the impressions that there was an initial cerebellar stroke followed by a subsequent cerebellar stroke in the opposite side. Tilt table test was negative. He saw hematologist and had a negative hypercoagulable workup. He has a loop recorder.\par \par He was doing well until 9/18/18.  After eating a very heavy meal prior to fasting he had another episode although not quite as severe or prolonged than the others. He believes it was partially precipitated by him wearing a very tight shirt around the neck.\par \par On 10/9/18 he had another episode that was different from prior ones. This was more of a lightheaded sensation And generalized weakness. It lasted about 8 hours. He was in the hospital under observation. Repeat brain MRI showed no change. Blood pressure medications were reduced. He remains on Aggrenox. He had been fine since.\par \par  Risk factors including blood pressure and cholesterol have been under excellent control.\par \par He had been doing well, continuing Aggrenox. 6/4/19  he had another spell. He became weak, lightheaded and diaphoretic. He did not pass out. It resolved after about 30 minutes.\par \par Brain MRI at that time showed nothing acute. Interrogation of the loop recorder was unremarkable.\par \par On 7/16/19 had another episode of lightheadedness. He went to the hospital. Brain MRI at that time showed a new left cerebellar infarct. MRA of the head and neck was negative. The loop recorder has shown no arrhythmia. EEG was normal. Cholesterol and LDL have been fine. We then switched him to aspirin initially 325 mg a day and Plavix 75 mg a day. He has remained symptom free. He is having no problem with the medication. Aspirin was reduced to 81 mg a day.\par \par He   sought second opinions from Dr. Salcedo and Dr. Anderson. No changes were recommended. He says he has had  guaiac positive stools, but on recent testing they were negative. . Endoscopy and colonoscopy are on hold. \par \par Early March of 2020 he developed mild fever and cough. Him and his wife tested positive for Covid 19. Symptoms remained mild. Quarantined at home. Had 2 episodes of confusion during this. One occasion he couldn't do simple math, the other he didn't recognize his wife briefly upon awakening. Other than these episodes he has been fine.\par \par He had been doing fine until 7/5/20. [facility] and became very dehydrated. He experienced one of his spells where things started to tilt on him. He was admitted to [facility]. MRI of the brain showed nothing acute, just old strokes. 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 A CTA, however, showed small vertebral arteries especially on the right without any focal stenosis or dissection. He has been fine since. They switched the  Plavix to Brilinta. He is on aspirin 81 mg a day as well. He subsequently saw Dr. Anderson at [place] who recommended going back to the Plavix instead of Brillinta since there had been no stroke. He has been asymptomatic since.\par \par He has remained stable without any further events.His wife had reported some short-term memory loss ever since Covid infection in March of 2020. That has remained stable as well.\par \par He returns today, 6/15/2022 with his wife.  Have not seen him in about a year.  They reports decline in memory.  He has intermittent trouble expressing himself.  That is going on about 6 months.  It is worse late in the day.  He also has some hallucinations where he was seeing multiple people.  He is having trouble performing simple mathematical tasks.  He remains on aspirin, Plavix, statin as well as blood pressure medication.

## 2022-07-18 ENCOUNTER — APPOINTMENT (OUTPATIENT)
Dept: NEUROLOGY | Facility: CLINIC | Age: 77
End: 2022-07-18

## 2022-07-18 PROCEDURE — 95819 EEG AWAKE AND ASLEEP: CPT

## 2022-07-18 PROCEDURE — 93040 RHYTHM ECG WITH REPORT: CPT

## 2022-09-14 ENCOUNTER — APPOINTMENT (OUTPATIENT)
Dept: NEUROLOGY | Facility: CLINIC | Age: 77
End: 2022-09-14

## 2022-09-14 VITALS
BODY MASS INDEX: 27.92 KG/M2 | WEIGHT: 195 LBS | SYSTOLIC BLOOD PRESSURE: 138 MMHG | DIASTOLIC BLOOD PRESSURE: 80 MMHG | HEIGHT: 70 IN

## 2022-09-14 PROCEDURE — 99215 OFFICE O/P EST HI 40 MIN: CPT

## 2022-09-14 NOTE — ASSESSMENT
[FreeTextEntry1] : Neurologically normal. He has had 3 strokes but none since July 2019 when we added Plavix. He has had 2 additional opinions and they recommended no significant changes.He will continue aspirin 81 mg a day in addition to Plavix. \par \par Cognitive impairment.  Precise diagnosis unclear.\par I have explained in great detail that currently available medications work to help slow down further memory decline rather than reverse existing memory loss and they seem to understand this.\par We will start donepezil 5 mg a day\par I have encouraged both physical and mental exercise and adequate hydration\par Potential side effects, especially gastrointestinal have been discussed\par If he is tolerating it we will increase to the therapeutic 10 mg daily dose after 1 month\par \par Routine follow-up scheduled in 4 months and by phone prior to that as needed.\par

## 2022-09-14 NOTE — CONSULT LETTER
[Dear  ___] : Dear  [unfilled], [Consult Letter:] : I had the pleasure of evaluating your patient, [unfilled]. [Please see my note below.] : Please see my note below. [Consult Closing:] : Thank you very much for allowing me to participate in the care of this patient.  If you have any questions, please do not hesitate to contact me. [Sincerely,] : Sincerely, [FreeTextEntry3] : Bandar Iyer MD, PhD, DPN-N\par [facility]\par Neurology at [place]\par Chief, Division of Neurology\par [facility]\par

## 2022-09-14 NOTE — PHYSICAL EXAM
[General Appearance - Alert] : alert [General Appearance - In No Acute Distress] : in no acute distress [General Appearance - Well-Appearing] : healthy appearing [Oriented To Time, Place, And Person] : oriented to person, place, and time [Impaired Insight] : insight and judgment were intact [Affect] : the affect was normal [Memory Recent] : recent memory was not impaired [Person] : oriented to person [Place] : oriented to place [Time] : oriented to time [Short Term Intact] : short term memory intact [Remote Intact] : remote memory intact [Registration Intact] : recent registration memory intact [Concentration Intact] : normal concentrating ability [Fluency] : fluency intact [Comprehension] : comprehension intact [Current Events] : adequate knowledge of current events [Past History] : adequate knowledge of personal past history [Cranial Nerves Optic (II)] : visual acuity intact bilaterally,  visual fields full to confrontation, pupils equal round and reactive to light [Cranial Nerves Oculomotor (III)] : extraocular motion intact [Cranial Nerves Trigeminal (V)] : facial sensation intact symmetrically [Cranial Nerves Facial (VII)] : face symmetrical [Cranial Nerves Vestibulocochlear (VIII)] : hearing was intact bilaterally [Motor Tone] : muscle tone was normal in all four extremities [Motor Strength] : muscle strength was normal in all four extremities [No Muscle Atrophy] : normal bulk in all four extremities [Sensation Tactile Decrease] : light touch was intact [Sensation Pain / Temperature Decrease] : pain and temperature was intact [Abnormal Walk] : normal gait [Balance] : balance was intact [2+] : Ankle jerk left 2+ [PERRL With Normal Accommodation] : pupils were equal in size, round, reactive to light, with normal accommodation [Edema] : there was no peripheral edema [Paresis Pronator Drift Right-Sided] : no pronator drift on the right [Paresis Pronator Drift Left-Sided] : no pronator drift on the left [Past-pointing] : there was no past-pointing [Tremor] : no tremor present [Coordination - Dysmetria Impaired Finger-to-Nose Bilateral] : not present [Coordination - Dysmetria Impaired Heel-to-Shin Bilateral] : not present [Plantar Reflex Right Only] : normal on the right [Plantar Reflex Left Only] : normal on the left [FreeTextEntry4] : Short-term recall 3/3.

## 2022-09-23 ENCOUNTER — NON-APPOINTMENT (OUTPATIENT)
Age: 77
End: 2022-09-23

## 2022-11-07 ENCOUNTER — OFFICE (OUTPATIENT)
Dept: URBAN - METROPOLITAN AREA CLINIC 115 | Facility: CLINIC | Age: 77
Setting detail: OPHTHALMOLOGY
End: 2022-11-07
Payer: MEDICARE

## 2022-11-07 DIAGNOSIS — H01.004: ICD-10-CM

## 2022-11-07 DIAGNOSIS — H01.001: ICD-10-CM

## 2022-11-07 DIAGNOSIS — H26.493: ICD-10-CM

## 2022-11-07 DIAGNOSIS — H35.343: ICD-10-CM

## 2022-11-07 DIAGNOSIS — H31.093: ICD-10-CM

## 2022-11-07 DIAGNOSIS — H01.005: ICD-10-CM

## 2022-11-07 DIAGNOSIS — H01.002: ICD-10-CM

## 2022-11-07 DIAGNOSIS — H43.813: ICD-10-CM

## 2022-11-07 DIAGNOSIS — H40.013: ICD-10-CM

## 2022-11-07 PROBLEM — H52.7 REFRACTIVE ERROR: Status: ACTIVE | Noted: 2022-11-07

## 2022-11-07 PROCEDURE — 99213 OFFICE O/P EST LOW 20 MIN: CPT | Performed by: OPHTHALMOLOGY

## 2022-11-07 ASSESSMENT — SPHEQUIV_DERIVED
OS_SPHEQUIV: -1
OD_SPHEQUIV: -0.875

## 2022-11-07 ASSESSMENT — AXIALLENGTH_DERIVED
OS_AL: 23.8465
OD_AL: 24.0805

## 2022-11-07 ASSESSMENT — REFRACTION_CURRENTRX
OD_AXIS: 168
OD_CYLINDER: -1.00
OS_SPHERE: +0.50
OD_AXIS: 171
OS_VPRISM_DIRECTION: SV
OD_VPRISM_DIRECTION: SV
OD_SPHERE: +3.25
OD_AXIS: 169
OD_SPHERE: -0.25
OS_CYLINDER: -0.75
OD_OVR_VA: 20/
OD_OVR_VA: 20/
OS_AXIS: 005
OD_VPRISM_DIRECTION: SV
OS_SPHERE: +0.50
OS_OVR_VA: 20/
OS_SPHERE: +3.50
OS_OVR_VA: 20/
OD_CYLINDER: -0.75
OS_OVR_VA: 20/
OS_CYLINDER: -1.00
OD_SPHERE: -0.25
OS_VPRISM_DIRECTION: SV
OD_OVR_VA: 20/
OS_AXIS: 175
OS_CYLINDER: -1.00
OD_CYLINDER: -1.00

## 2022-11-07 ASSESSMENT — KERATOMETRY
OS_K1POWER_DIOPTERS: 42.75
OS_K2POWER_DIOPTERS: 45.00
OD_AXISANGLE_DEGREES: 66
OD_K1POWER_DIOPTERS: 42.75
OS_AXISANGLE_DEGREES: 93
OD_K2POWER_DIOPTERS: 43.50

## 2022-11-07 ASSESSMENT — LID EXAM ASSESSMENTS
OS_BLEPHARITIS: LLL LUL 1+
OD_BLEPHARITIS: RLL RUL 1+

## 2022-11-07 ASSESSMENT — PACHYMETRY
OD_CT_UM: 625
OD_CT_CORRECTION: -6
OS_CT_UM: 641
OS_CT_CORRECTION: -7

## 2022-11-07 ASSESSMENT — REFRACTION_AUTOREFRACTION
OS_SPHERE: +0.25
OD_SPHERE: -0.25
OS_CYLINDER: -2.50
OD_AXIS: 168
OD_CYLINDER: -1.25
OS_AXIS: 004

## 2022-11-07 ASSESSMENT — TONOMETRY
OD_IOP_MMHG: 19
OS_IOP_MMHG: 17

## 2022-11-07 ASSESSMENT — CONFRONTATIONAL VISUAL FIELD TEST (CVF)
OD_FINDINGS: FULL
OS_FINDINGS: FULL

## 2022-11-07 ASSESSMENT — VISUAL ACUITY
OS_BCVA: 20/50-1
OD_BCVA: 20/50

## 2022-11-28 ENCOUNTER — NON-APPOINTMENT (OUTPATIENT)
Age: 77
End: 2022-11-28

## 2022-11-28 RX ORDER — DONEPEZIL HYDROCHLORIDE 10 MG/1
10 TABLET ORAL DAILY
Qty: 90 | Refills: 3 | Status: DISCONTINUED | COMMUNITY
Start: 2022-11-04 | End: 2022-11-28

## 2023-01-17 ENCOUNTER — APPOINTMENT (OUTPATIENT)
Dept: NEUROLOGY | Facility: CLINIC | Age: 78
End: 2023-01-17
Payer: MEDICARE

## 2023-01-17 VITALS
BODY MASS INDEX: 27.92 KG/M2 | DIASTOLIC BLOOD PRESSURE: 70 MMHG | SYSTOLIC BLOOD PRESSURE: 138 MMHG | WEIGHT: 195 LBS | HEIGHT: 70 IN

## 2023-01-17 PROCEDURE — 99214 OFFICE O/P EST MOD 30 MIN: CPT

## 2023-01-17 NOTE — HISTORY OF PRESENT ILLNESS
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  3 months ago he started to develop episodes where it appears as if the floor is buckling and he feels drunk. He becomes lightheaded and nearly passes out. He has to lay down immediately to prevent himself from passing out. He had 4 of these episodes and was admitted to [facility] May 30 through June 1. Imaging studies which are discussed further below showed an acute left cerebellar infarct. He was discharged on aspirin and Lipitor. He was symptom free until about a week ago when he again started to develop similar episodes, however this time the floor did not buckle. He was readmitted to the hospital where a new MRI showed a recent infarct in the right medial cerebellum. Of note he had a CANDY and a loop recorder placed. He had an additional episode last night but did not go to the hospital this time.\par \par We put him on Aggrenox twice a day. After I saw him I actually reviewed the images of his MRIs and concurred with the impressions that there was an initial cerebellar stroke followed by a subsequent cerebellar stroke in the opposite side. Tilt table test was negative. He saw hematologist and had a negative hypercoagulable workup. He has a loop recorder.\par \par He was doing well until 9/18/18.  After eating a very heavy meal prior to fasting he had another episode although not quite as severe or prolonged than the others. He believes it was partially precipitated by him wearing a very tight shirt around the neck.\par \par On 10/9/18 he had another episode that was different from prior ones. This was more of a lightheaded sensation And generalized weakness. It lasted about 8 hours. He was in the hospital under observation. Repeat brain MRI showed no change. Blood pressure medications were reduced. He remains on Aggrenox. He had been fine since.\par \par  Risk factors including blood pressure and cholesterol have been under excellent control.\par \par He had been doing well, continuing Aggrenox. 6/4/19  he had another spell. He became weak, lightheaded and diaphoretic. He did not pass out. It resolved after about 30 minutes.\par \par Brain MRI at that time showed nothing acute. Interrogation of the loop recorder was unremarkable.\par \par On 7/16/19 had another episode of lightheadedness. He went to the hospital. Brain MRI at that time showed a new left cerebellar infarct. MRA of the head and neck was negative. The loop recorder has shown no arrhythmia. EEG was normal. Cholesterol and LDL have been fine. We then switched him to aspirin initially 325 mg a day and Plavix 75 mg a day. He has remained symptom free. He is having no problem with the medication. Aspirin was reduced to 81 mg a day.\par \par He   sought second opinions from Dr. Salcedo and Dr. Anderson. No changes were recommended. He says he has had  guaiac positive stools, but on recent testing they were negative. . Endoscopy and colonoscopy are on hold. \par \par Early March of 2020 he developed mild fever and cough. Him and his wife tested positive for Covid 19. Symptoms remained mild. Quarantined at home. Had 2 episodes of confusion during this. One occasion he couldn't do simple math, the other he didn't recognize his wife briefly upon awakening. Other than these episodes he has been fine.\par \par He had been doing fine until 7/5/20. He went to hospitals and became very dehydrated. He experienced one of his spells where things started to tilt on him. He was admitted to [facility]. MRI of the brain showed nothing acute, just old strokes. 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 A CTA, however, showed small vertebral arteries especially on the right without any focal stenosis or dissection. He has been fine since. They switched the  Plavix to Brilinta. He is on aspirin 81 mg a day as well. He subsequently saw Dr. Anderson at [place] who recommended going back to the Plavix instead of Brillinta since there had been no stroke. He has been asymptomatic since.\par \par He has remained stable without any further events.His wife had reported some short-term memory loss ever since Covid infection in March of 2020. That has remained stable as well.\par \par He returned6/15/2022 with his wife.  Have not seen him in about a year.  They reports decline in memory.  He has intermittent trouble expressing himself.  That is going on about 6 months.  It is worse late in the day.  He also has some hallucinations where he was seeing multiple people.  He is having trouble performing simple mathematical tasks.  He remains on aspirin, Plavix, statin as well as blood pressure medication.\par \par I referred him for a brain MRI which was negative except for his prior strokes.  EEG was normal.  I referred him for neuropsychological evaluation.  That report has been reviewed and discussed with the patient and his wife.  There were neurocognitive abnormalities in domains including memory, executive functions and intellectual functions.  The neuropsychologist did not feel findings were consistent with Alzheimer's but there was definite cognitive impairment.\par \par I started him on donepezil 5 mg a day.  Two attempts to increase it to 10 mg a day resulted in muscle cramps so he has remained on 5 mg a day.  Both him and his wife say his memory has remained stable

## 2023-01-17 NOTE — ASSESSMENT
[FreeTextEntry1] : Neurologically normal. He has had 3 strokes but none since July 2019 when we added Plavix. He has had 2 additional opinions and they recommended no significant changes.He will continue aspirin 81 mg a day in addition to Plavix. \par \par Cognitive impairment.  Precise diagnosis unclear.\par I have explained in great detail that currently available medications work to help slow down further memory decline rather than reverse existing memory loss and they seem to understand this.\par We will start donepezil 5 mg a day\par I have encouraged both physical and mental exercise and adequate hydration as well as a healthy diet.\par He has not been able to increase the donepezil to the therapeutic 10 mg daily dose due to muscle cramps\par We will introduce memantine to build up to the therapeutic 10 mg twice daily dose and he will continue the 5 mg donepezil\par \par Routine follow-up scheduled in 6 months and by phone prior to that as needed.\par

## 2023-01-17 NOTE — CONSULT LETTER
[Dear  ___] : Dear  [unfilled], [Consult Letter:] : I had the pleasure of evaluating your patient, [unfilled]. [Please see my note below.] : Please see my note below. [Consult Closing:] : Thank you very much for allowing me to participate in the care of this patient.  If you have any questions, please do not hesitate to contact me. [Sincerely,] : Sincerely, [FreeTextEntry3] : Bandar Iyer MD, PhD, DPN-N\par [facility]\par Neurology at [place]\par Chief, Division of Neurology\par [facility]\par

## 2023-01-17 NOTE — PHYSICAL EXAM
[General Appearance - Alert] : alert [General Appearance - In No Acute Distress] : in no acute distress [General Appearance - Well-Appearing] : healthy appearing [Oriented To Time, Place, And Person] : oriented to person, place, and time [Impaired Insight] : insight and judgment were intact [Affect] : the affect was normal [Memory Recent] : recent memory was not impaired [Person] : oriented to person [Place] : oriented to place [Time] : oriented to time [Short Term Intact] : short term memory intact [Remote Intact] : remote memory intact [Registration Intact] : recent registration memory intact [Concentration Intact] : normal concentrating ability [Fluency] : fluency intact [Comprehension] : comprehension intact [Current Events] : adequate knowledge of current events [Past History] : adequate knowledge of personal past history [Cranial Nerves Optic (II)] : visual acuity intact bilaterally,  visual fields full to confrontation, pupils equal round and reactive to light [Cranial Nerves Oculomotor (III)] : extraocular motion intact [Cranial Nerves Trigeminal (V)] : facial sensation intact symmetrically [Cranial Nerves Facial (VII)] : face symmetrical [Cranial Nerves Vestibulocochlear (VIII)] : hearing was intact bilaterally [Motor Tone] : muscle tone was normal in all four extremities [Motor Strength] : muscle strength was normal in all four extremities [No Muscle Atrophy] : normal bulk in all four extremities [Paresis Pronator Drift Right-Sided] : no pronator drift on the right [Paresis Pronator Drift Left-Sided] : no pronator drift on the left [Sensation Tactile Decrease] : light touch was intact [Sensation Pain / Temperature Decrease] : pain and temperature was intact [Abnormal Walk] : normal gait [Balance] : balance was intact [Past-pointing] : there was no past-pointing [Tremor] : no tremor present [Coordination - Dysmetria Impaired Finger-to-Nose Bilateral] : not present [Coordination - Dysmetria Impaired Heel-to-Shin Bilateral] : not present [2+] : Ankle jerk left 2+ [Plantar Reflex Right Only] : normal on the right [Plantar Reflex Left Only] : normal on the left [FreeTextEntry4] : Short-term recall 3/3.   [PERRL With Normal Accommodation] : pupils were equal in size, round, reactive to light, with normal accommodation

## 2023-04-19 ENCOUNTER — OUTPATIENT (OUTPATIENT)
Dept: OUTPATIENT SERVICES | Facility: HOSPITAL | Age: 78
LOS: 1 days | End: 2023-04-19

## 2023-04-19 ENCOUNTER — APPOINTMENT (OUTPATIENT)
Dept: OPHTHALMOLOGY | Facility: CLINIC | Age: 78
End: 2023-04-19

## 2023-04-21 DIAGNOSIS — H26.491 OTHER SECONDARY CATARACT, RIGHT EYE: ICD-10-CM

## 2023-07-18 ENCOUNTER — APPOINTMENT (OUTPATIENT)
Dept: NEUROLOGY | Facility: CLINIC | Age: 78
End: 2023-07-18
Payer: MEDICARE

## 2023-07-18 VITALS
SYSTOLIC BLOOD PRESSURE: 122 MMHG | BODY MASS INDEX: 27.92 KG/M2 | WEIGHT: 195 LBS | HEIGHT: 70 IN | DIASTOLIC BLOOD PRESSURE: 80 MMHG

## 2023-07-18 PROCEDURE — 99214 OFFICE O/P EST MOD 30 MIN: CPT

## 2023-07-18 NOTE — HISTORY OF PRESENT ILLNESS
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  3 months ago he started to develop episodes where it appears as if the floor is buckling and he feels drunk. He becomes lightheaded and nearly passes out. He has to lay down immediately to prevent himself from passing out. He had 4 of these episodes and was admitted to [facility] May 30 through June 1. Imaging studies which are discussed further below showed an acute left cerebellar infarct. He was discharged on aspirin and Lipitor. He was symptom free until about a week ago when he again started to develop similar episodes, however this time the floor did not buckle. He was readmitted to the hospital where a new MRI showed a recent infarct in the right medial cerebellum. Of note he had a CANDY and a loop recorder placed. He had an additional episode last night but did not go to the hospital this time.\par \par We put him on Aggrenox twice a day. After I saw him I actually reviewed the images of his MRIs and concurred with the impressions that there was an initial cerebellar stroke followed by a subsequent cerebellar stroke in the opposite side. Tilt table test was negative. He saw hematologist and had a negative hypercoagulable workup. He has a loop recorder.\par \par He was doing well until 9/18/18.  After eating a very heavy meal prior to fasting he had another episode although not quite as severe or prolonged than the others. He believes it was partially precipitated by him wearing a very tight shirt around the neck.\par \par On 10/9/18 he had another episode that was different from prior ones. This was more of a lightheaded sensation And generalized weakness. It lasted about 8 hours. He was in the hospital under observation. Repeat brain MRI showed no change. Blood pressure medications were reduced. He remains on Aggrenox. He had been fine since.\par \par  Risk factors including blood pressure and cholesterol have been under excellent control.\par \par He had been doing well, continuing Aggrenox. 6/4/19  he had another spell. He became weak, lightheaded and diaphoretic. He did not pass out. It resolved after about 30 minutes.\par \par Brain MRI at that time showed nothing acute. Interrogation of the loop recorder was unremarkable.\par \par On 7/16/19 had another episode of lightheadedness. He went to the hospital. Brain MRI at that time showed a new left cerebellar infarct. MRA of the head and neck was negative. The loop recorder has shown no arrhythmia. EEG was normal. Cholesterol and LDL have been fine. We then switched him to aspirin initially 325 mg a day and Plavix 75 mg a day. He has remained symptom free. He is having no problem with the medication. Aspirin was reduced to 81 mg a day.\par \par He   sought second opinions from Dr. Salcedo and Dr. Adnerson. No changes were recommended. He says he has had  guaiac positive stools, but on recent testing they were negative. . Endoscopy and colonoscopy are on hold. \par \par Early March of 2020 he developed mild fever and cough. Him and his wife tested positive for Covid 19. Symptoms remained mild. Quarantined at home. Had 2 episodes of confusion during this. One occasion he couldn't do simple math, the other he didn't recognize his wife briefly upon awakening. Other than these episodes he has been fine.\par \par He had been doing fine until 7/5/20. He went to [facility] and became very dehydrated. He experienced one of his spells where things started to tilt on him. He was admitted to [facility]. MRI of the brain showed nothing acute, just old strokes. 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 A CTA, however, showed small vertebral arteries especially on the right without any focal stenosis or dissection. He has been fine since. They switched the  Plavix to Brilinta. He is on aspirin 81 mg a day as well. He subsequently saw Dr. Anderson at [place] who recommended going back to the Plavix instead of Brillinta since there had been no stroke. He has been asymptomatic since.\par \par He has remained stable without any further events.His wife had reported some short-term memory loss ever since Covid infection in March of 2020. That has remained stable as well.\par \par He returned6/15/2022 with his wife.  Have not seen him in about a year.  They reports decline in memory.  He has intermittent trouble expressing himself.  That is going on about 6 months.  It is worse late in the day.  He also has some hallucinations where he was seeing multiple people.  He is having trouble performing simple mathematical tasks.  He remains on aspirin, Plavix, statin as well as blood pressure medication.\par \par I referred him for a brain MRI which was negative except for his prior strokes.  EEG was normal.  I referred him for neuropsychological evaluation.  That report has been reviewed and discussed with the patient and his wife.  There were neurocognitive abnormalities in domains including memory, executive functions and intellectual functions.  The neuropsychologist did not feel findings were consistent with Alzheimer's but there was definite cognitive impairment.\par \par I started him on donepezil 5 mg a day.  Two attempts to increase it to 10 mg a day resulted in muscle cramps so he has remained on 5 mg a day.  He subsequently added memantine and built up to the 10 mg twice daily daily dose.  He says he is only taking it once a day because twice a day he was getting stomach cramps.  Both him and his wife say his memory has remained stable

## 2023-07-18 NOTE — ASSESSMENT
[FreeTextEntry1] : Neurologically normal. He has had 3 strokes but none since July 2019 when we added Plavix. He has had 2 additional opinions and they recommended no significant changes.He will continue aspirin 81 mg a day in addition to Plavix. \par \par Cognitive impairment.  Precise diagnosis unclear.\par I have explained in great detail that currently available medications work to help slow down further memory decline rather than reverse existing memory loss and they seem to understand this.\par I have encouraged both physical and mental exercise and adequate hydration as well as a healthy diet.\par He has not been able to increase the donepezil to the therapeutic 10 mg daily dose due to muscle cramps\par He will also continue memantine 10 mg once a day.  He says that taking it twice a day gave him stomach cramps.\par \par Routine follow-up scheduled in 6 months and by phone prior to that as needed.\par

## 2023-07-18 NOTE — CONSULT LETTER
[Dear  ___] : Dear  [unfilled], [Consult Letter:] : I had the pleasure of evaluating your patient, [unfilled]. [Please see my note below.] : Please see my note below. [Consult Closing:] : Thank you very much for allowing me to participate in the care of this patient.  If you have any questions, please do not hesitate to contact me. [Sincerely,] : Sincerely, [FreeTextEntry3] : Bandar Iyer MD, PhD, DPN-N\par [facility]\par Neurology at [place]\par Chief, Division of Neurology\par [facility]\par

## 2023-12-05 ENCOUNTER — RX RENEWAL (OUTPATIENT)
Age: 78
End: 2023-12-05

## 2024-02-07 ENCOUNTER — APPOINTMENT (OUTPATIENT)
Dept: NEUROLOGY | Facility: CLINIC | Age: 79
End: 2024-02-07
Payer: MEDICARE

## 2024-02-07 VITALS
HEIGHT: 70 IN | DIASTOLIC BLOOD PRESSURE: 80 MMHG | BODY MASS INDEX: 27.2 KG/M2 | SYSTOLIC BLOOD PRESSURE: 140 MMHG | WEIGHT: 190 LBS

## 2024-02-07 DIAGNOSIS — G31.84 MILD COGNITIVE IMPAIRMENT, SO STATED: ICD-10-CM

## 2024-02-07 DIAGNOSIS — I63.213 CEREBRAL INFARCTION DUE TO UNSPECIFIED OCCLUSION OR STENOSIS OF BILATERAL VERTEBRAL ARTERIES: ICD-10-CM

## 2024-02-07 PROCEDURE — G2211 COMPLEX E/M VISIT ADD ON: CPT

## 2024-02-07 PROCEDURE — 99215 OFFICE O/P EST HI 40 MIN: CPT

## 2024-02-07 RX ORDER — MEMANTINE HYDROCHLORIDE 10 MG/1
10 TABLET, FILM COATED ORAL
Qty: 180 | Refills: 3 | Status: ACTIVE | COMMUNITY
Start: 2023-01-17 | End: 1900-01-01

## 2024-02-07 RX ORDER — CLOPIDOGREL BISULFATE 75 MG/1
75 TABLET, FILM COATED ORAL
Qty: 90 | Refills: 3 | Status: ACTIVE | COMMUNITY
Start: 2019-07-23 | End: 1900-01-01

## 2024-02-07 RX ORDER — ATORVASTATIN CALCIUM 40 MG/1
40 TABLET, FILM COATED ORAL
Qty: 90 | Refills: 3 | Status: ACTIVE | COMMUNITY
Start: 2019-02-14 | End: 1900-01-01

## 2024-02-07 RX ORDER — AMLODIPINE BESYLATE 10 MG/1
10 TABLET ORAL DAILY
Qty: 90 | Refills: 3 | Status: ACTIVE | COMMUNITY
Start: 2024-02-07 | End: 1900-01-01

## 2024-02-07 RX ORDER — DONEPEZIL HYDROCHLORIDE 10 MG/1
10 TABLET ORAL
Qty: 90 | Refills: 3 | Status: ACTIVE | COMMUNITY
Start: 2022-09-14 | End: 1900-01-01

## 2024-02-07 NOTE — ASSESSMENT
[FreeTextEntry1] : Neurologically normal. He has had 3 strokes but none since July 2019 when we added Plavix. He has had 2 additional opinions and they recommended no significant changes.He will continue aspirin 81 mg a day in addition to Plavix.   Cognitive impairment.  Precise diagnosis unclear. I have explained in great detail that currently available medications work to help slow down further memory decline rather than reverse existing memory loss and they seem to understand this. I have encouraged both physical and mental exercise and adequate hydration as well as a healthy diet. There has been decline according to his wife He will again try to increase the donepezil to 10 mg a day for a month and if tolerating that go to memantine 10 mg twice a day.  Routine follow-up scheduled in 6 months and by phone prior to that as needed.

## 2024-02-07 NOTE — HISTORY OF PRESENT ILLNESS
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  3 months ago he started to develop episodes where it appears as if the floor is buckling and he feels drunk. He becomes lightheaded and nearly passes out. He has to lay down immediately to prevent himself from passing out. He had 4 of these episodes and was admitted to [facility] May 30 through June 1. Imaging studies which are discussed further below showed an acute left cerebellar infarct. He was discharged on aspirin and Lipitor. He was symptom free until about a week ago when he again started to develop similar episodes, however this time the floor did not buckle. He was readmitted to the hospital where a new MRI showed a recent infarct in the right medial cerebellum. Of note he had a CANDY and a loop recorder placed. He had an additional episode last night but did not go to the hospital this time.  We put him on Aggrenox twice a day. After I saw him I actually reviewed the images of his MRIs and concurred with the impressions that there was an initial cerebellar stroke followed by a subsequent cerebellar stroke in the opposite side. Tilt table test was negative. He saw hematologist and had a negative hypercoagulable workup. He has a loop recorder.  He was doing well until 9/18/18.  After eating a very heavy meal prior to fasting he had another episode although not quite as severe or prolonged than the others. He believes it was partially precipitated by him wearing a very tight shirt around the neck.  On 10/9/18 he had another episode that was different from prior ones. This was more of a lightheaded sensation And generalized weakness. It lasted about 8 hours. He was in the hospital under observation. Repeat brain MRI showed no change. Blood pressure medications were reduced. He remains on Aggrenox. He had been fine since.   Risk factors including blood pressure and cholesterol have been under excellent control.  He had been doing well, continuing Aggrenox. 6/4/19  he had another spell. He became weak, lightheaded and diaphoretic. He did not pass out. It resolved after about 30 minutes.  Brain MRI at that time showed nothing acute. Interrogation of the loop recorder was unremarkable.  On 7/16/19 had another episode of lightheadedness. He went to the hospital. Brain MRI at that time showed a new left cerebellar infarct. MRA of the head and neck was negative. The loop recorder has shown no arrhythmia. EEG was normal. Cholesterol and LDL have been fine. We then switched him to aspirin initially 325 mg a day and Plavix 75 mg a day. He has remained symptom free. He is having no problem with the medication. Aspirin was reduced to 81 mg a day.  He   sought second opinions from Dr. Salcedo and Dr. Anderson. No changes were recommended. He says he has had  guaiac positive stools, but on recent testing they were negative. . Endoscopy and colonoscopy are on hold.   Early March of 2020 he developed mild fever and cough. Him and his wife tested positive for Covid 19. Symptoms remained mild. Quarantined at home. Had 2 episodes of confusion during this. One occasion he couldn't do simple math, the other he didn't recognize his wife briefly upon awakening. Other than these episodes he has been fine.  He had been doing fine until 7/5/20. He went to [facility] and became very dehydrated. He experienced one of his spells where things started to tilt on him. He was admitted to [facility] MRI of the brain showed nothing acute, just old strokes. 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 A CTA, however, showed small vertebral arteries especially on the right without any focal stenosis or dissection. He has been fine since. They switched the  Plavix to Brilinta. He is on aspirin 81 mg a day as well. He subsequently saw Dr. Anderson at [place] who recommended going back to the Plavix instead of Brillinta since there had been no stroke. He has been asymptomatic since.  He has remained stable without any further events.His wife had reported some short-term memory loss ever since Covid infection in March of 2020. That has remained stable as well.  He returned6/15/2022 with his wife.  Have not seen him in about a year.  They reports decline in memory.  He has intermittent trouble expressing himself.  That is going on about 6 months.  It is worse late in the day.  He also has some hallucinations where he was seeing multiple people.  He is having trouble performing simple mathematical tasks.  He remains on aspirin, Plavix, statin as well as blood pressure medication.  I referred him for a brain MRI which was negative except for his prior strokes.  EEG was normal.  I referred him for neuropsychological evaluation.  That report has been reviewed and discussed with the patient and his wife.  There were neurocognitive abnormalities in domains including memory, executive functions and intellectual functions.  The neuropsychologist did not feel findings were consistent with Alzheimer's but there was definite cognitive impairment.  I started him on donepezil 5 mg a day.  Two attempts to increase it to 10 mg a day resulted in muscle cramps so he has remained on 5 mg a day.  He subsequently added memantine and built up to the 10 mg twice daily daily dose.  He says he is only taking it once a day because twice a day he was getting stomach cramps.    Returns today for follow-up.  Lengthy discussion with him and particularly his wife She feels he is declining Gets lost at times Has not done anything dangerous

## 2024-02-07 NOTE — CONSULT LETTER
[Dear  ___] : Dear  [unfilled], [Consult Letter:] : I had the pleasure of evaluating your patient, [unfilled]. [Please see my note below.] : Please see my note below. [Consult Closing:] : Thank you very much for allowing me to participate in the care of this patient.  If you have any questions, please do not hesitate to contact me. [Sincerely,] : Sincerely, [FreeTextEntry3] : Bandar Iyer MD, PhD, DPN-N\par  [facility]\par  Neurology at [place]\par  Chief, Division of Neurology\par  [facility]\par

## 2024-02-07 NOTE — PHYSICAL EXAM
[General Appearance - Alert] : alert [General Appearance - In No Acute Distress] : in no acute distress [General Appearance - Well-Appearing] : healthy appearing [Oriented To Time, Place, And Person] : oriented to person, place, and time [Impaired Insight] : insight and judgment were intact [Affect] : the affect was normal [Person] : oriented to person [Place] : oriented to place [Time] : oriented to time [Short Term Intact] : short term memory impaired [Remote Intact] : remote memory intact [Registration Intact] : recent registration memory intact [Concentration Intact] : normal concentrating ability [Naming Objects] : no difficulty naming common objects [Fluency] : fluency intact [Comprehension] : comprehension intact [Past History] : adequate knowledge of personal past history [Cranial Nerves Optic (II)] : visual acuity intact bilaterally,  visual fields full to confrontation, pupils equal round and reactive to light [Cranial Nerves Oculomotor (III)] : extraocular motion intact [Cranial Nerves Trigeminal (V)] : facial sensation intact symmetrically [Cranial Nerves Facial (VII)] : face symmetrical [Cranial Nerves Vestibulocochlear (VIII)] : hearing was intact bilaterally [Motor Tone] : muscle tone was normal in all four extremities [Motor Strength] : muscle strength was normal in all four extremities [No Muscle Atrophy] : normal bulk in all four extremities [Paresis Pronator Drift Right-Sided] : no pronator drift on the right [Paresis Pronator Drift Left-Sided] : no pronator drift on the left [Sensation Tactile Decrease] : light touch was intact [Sensation Pain / Temperature Decrease] : pain and temperature was intact [Abnormal Walk] : normal gait [Balance] : balance was intact [Past-pointing] : there was no past-pointing [Tremor] : no tremor present [Coordination - Dysmetria Impaired Finger-to-Nose Bilateral] : not present [Coordination - Dysmetria Impaired Heel-to-Shin Bilateral] : not present [2+] : Ankle jerk left 2+ [Plantar Reflex Right Only] : normal on the right [Plantar Reflex Left Only] : normal on the left [FreeTextEntry4] : Short-term recall 3/3.   [PERRL With Normal Accommodation] : pupils were equal in size, round, reactive to light, with normal accommodation

## 2024-08-07 ENCOUNTER — APPOINTMENT (OUTPATIENT)
Dept: NEUROLOGY | Facility: CLINIC | Age: 79
End: 2024-08-07

## 2024-08-07 PROCEDURE — 99215 OFFICE O/P EST HI 40 MIN: CPT

## 2024-08-07 PROCEDURE — G2211 COMPLEX E/M VISIT ADD ON: CPT

## 2024-08-07 NOTE — HISTORY OF PRESENT ILLNESS
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  3 months ago he started to develop episodes where it appears as if the floor is buckling and he feels drunk. He becomes lightheaded and nearly passes out. He has to lay down immediately to prevent himself from passing out. He had 4 of these episodes and was admitted to [facility] May 30 through June 1. Imaging studies which are discussed further below showed an acute left cerebellar infarct. He was discharged on aspirin and Lipitor. He was symptom free until about a week ago when he again started to develop similar episodes, however this time the floor did not buckle. He was readmitted to the hospital where a new MRI showed a recent infarct in the right medial cerebellum. Of note he had a CANDY and a loop recorder placed. He had an additional episode last night but did not go to the hospital this time.  We put him on Aggrenox twice a day. After I saw him I actually reviewed the images of his MRIs and concurred with the impressions that there was an initial cerebellar stroke followed by a subsequent cerebellar stroke in the opposite side. Tilt table test was negative. He saw hematologist and had a negative hypercoagulable workup. He has a loop recorder.  He was doing well until 9/18/18.  After eating a very heavy meal prior to fasting he had another episode although not quite as severe or prolonged than the others. He believes it was partially precipitated by him wearing a very tight shirt around the neck.  On 10/9/18 he had another episode that was different from prior ones. This was more of a lightheaded sensation And generalized weakness. It lasted about 8 hours. He was in the hospital under observation. Repeat brain MRI showed no change. Blood pressure medications were reduced. He remains on Aggrenox. He had been fine since.   Risk factors including blood pressure and cholesterol have been under excellent control.  He had been doing well, continuing Aggrenox. 6/4/19  he had another spell. He became weak, lightheaded and diaphoretic. He did not pass out. It resolved after about 30 minutes.  Brain MRI at that time showed nothing acute. Interrogation of the loop recorder was unremarkable.  On 7/16/19 had another episode of lightheadedness. He went to the hospital. Brain MRI at that time showed a new left cerebellar infarct. MRA of the head and neck was negative. The loop recorder has shown no arrhythmia. EEG was normal. Cholesterol and LDL have been fine. We then switched him to aspirin initially 325 mg a day and Plavix 75 mg a day. He has remained symptom free. He is having no problem with the medication. Aspirin was reduced to 81 mg a day.  He   sought second opinions from Dr. Salcedo and Dr. Anderson. No changes were recommended. He says he has had  guaiac positive stools, but on recent testing they were negative. . Endoscopy and colonoscopy are on hold.   Early March of 2020 he developed mild fever and cough. Him and his wife tested positive for Covid 19. Symptoms remained mild. Quarantined at home. Had 2 episodes of confusion during this. One occasion he couldn't do simple math, the other he didn't recognize his wife briefly upon awakening. Other than these episodes he has been fine.  He had been doing fine until 7/5/20. He went to [facility] and became very dehydrated. He experienced one of his spells where things started to tilt on him. He was admitted to [facility]. MRI of the brain showed nothing acute, just old strokes. 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 A CTA, however, showed small vertebral arteries especially on the right without any focal stenosis or dissection. He has been fine since. They switched the  Plavix to Brilinta. He is on aspirin 81 mg a day as well. He subsequently saw Dr. Anderson at [place] who recommended going back to the Plavix instead of Brillinta since there had been no stroke. He has been asymptomatic since.  He has remained stable without any further events.His wife had reported some short-term memory loss ever since Covid infection in March of 2020. That has remained stable as well.  He returned6/15/2022 with his wife.  Have not seen him in about a year.  They reports decline in memory.  He has intermittent trouble expressing himself.  That is going on about 6 months.  It is worse late in the day.  He also has some hallucinations where he was seeing multiple people.  He is having trouble performing simple mathematical tasks.  He remains on aspirin, Plavix, statin as well as blood pressure medication.  I referred him for a brain MRI which was negative except for his prior strokes.  EEG was normal.  I referred him for neuropsychological evaluation.  That report has been reviewed and discussed with the patient and his wife.  There were neurocognitive abnormalities in domains including memory, executive functions and intellectual functions.  The neuropsychologist did not feel findings were consistent with Alzheimer's but there was definite cognitive impairment.  I started him on donepezil 5 mg a day.  Two attempts to increase it to 10 mg a day resulted in muscle cramps so he has remained on 5 mg a day.  He subsequently added memantine and built up to the 10 mg twice daily daily dose.  He says he is only taking it once a day because twice a day he was getting stomach cramps.    Returns today for follow-up.   Wife feels he is doing well without any further decline They misunderstood the instructions for the medication and he has been taking 20 mg of donepezil a day and 10 mg of memantine.  They wish to continue the 20 mg of donepezil although I explained to them that comes in 23 mg so we will prescribe that He will increase the memantine to 10 mg twice a day as previously directed

## 2024-08-07 NOTE — ASSESSMENT
[FreeTextEntry1] : Neurologically normal. He has had 3 strokes but none since July 2019 when we added Plavix. He has had 2 additional opinions and they recommended no significant changes.He will continue aspirin 81 mg a day in addition to Plavix.   Cognitive impairment.  Precise diagnosis unclear. I have explained in great detail that currently available medications work to help slow down further memory decline rather than reverse existing memory loss and they seem to understand this. I have encouraged both physical and mental exercise and adequate hydration as well as a healthy diet. Has been stable As per discussion above we will prescribe donepezil 23 mg a day and memantine 10 mg twice a day.  Routine follow-up scheduled in 6 months and by phone prior to that as needed.

## 2024-08-07 NOTE — PHYSICAL EXAM
[General Appearance - Alert] : alert [General Appearance - In No Acute Distress] : in no acute distress [General Appearance - Well-Appearing] : healthy appearing [Oriented To Time, Place, And Person] : oriented to person, place, and time [Impaired Insight] : insight and judgment were intact [Affect] : the affect was normal [Person] : oriented to person [Place] : oriented to place [Time] : oriented to time [Short Term Intact] : short term memory impaired [Remote Intact] : remote memory intact [Registration Intact] : recent registration memory intact [Concentration Intact] : normal concentrating ability [Naming Objects] : no difficulty naming common objects [Fluency] : fluency intact [Comprehension] : comprehension intact [Past History] : adequate knowledge of personal past history [Cranial Nerves Optic (II)] : visual acuity intact bilaterally,  visual fields full to confrontation, pupils equal round and reactive to light [Cranial Nerves Oculomotor (III)] : extraocular motion intact [Cranial Nerves Trigeminal (V)] : facial sensation intact symmetrically [Cranial Nerves Facial (VII)] : face symmetrical [Cranial Nerves Vestibulocochlear (VIII)] : hearing was intact bilaterally [Motor Tone] : muscle tone was normal in all four extremities [Motor Strength] : muscle strength was normal in all four extremities [No Muscle Atrophy] : normal bulk in all four extremities [Paresis Pronator Drift Left-Sided] : no pronator drift on the left [Paresis Pronator Drift Right-Sided] : no pronator drift on the right [Sensation Tactile Decrease] : light touch was intact [Sensation Pain / Temperature Decrease] : pain and temperature was intact [Abnormal Walk] : normal gait [Balance] : balance was intact [Past-pointing] : there was no past-pointing [Tremor] : no tremor present [Coordination - Dysmetria Impaired Finger-to-Nose Bilateral] : not present [Coordination - Dysmetria Impaired Heel-to-Shin Bilateral] : not present [2+] : Ankle jerk left 2+ [Plantar Reflex Right Only] : normal on the right [Plantar Reflex Left Only] : normal on the left [FreeTextEntry4] : Short-term recall 3/3.   [PERRL With Normal Accommodation] : pupils were equal in size, round, reactive to light, with normal accommodation

## 2024-08-07 NOTE — CONSULT LETTER
[Dear  ___] : Dear  [unfilled], [Consult Letter:] : I had the pleasure of evaluating your patient, [unfilled]. [Please see my note below.] : Please see my note below. [Consult Closing:] : Thank you very much for allowing me to participate in the care of this patient.  If you have any questions, please do not hesitate to contact me. [Sincerely,] : Sincerely, [FreeTextEntry3] : Bandar Iyer MD, PhD, DPN-N\par  [facility]\par  Neurology at [place]\par  Chief, Division of Neurology\par  [facility]\par

## 2024-11-25 ENCOUNTER — APPOINTMENT (OUTPATIENT)
Dept: NEUROLOGY | Facility: CLINIC | Age: 79
End: 2024-11-25
Payer: MEDICARE

## 2024-11-25 VITALS
HEIGHT: 72 IN | DIASTOLIC BLOOD PRESSURE: 80 MMHG | SYSTOLIC BLOOD PRESSURE: 128 MMHG | WEIGHT: 194 LBS | BODY MASS INDEX: 26.28 KG/M2

## 2024-11-25 DIAGNOSIS — I63.213 CEREBRAL INFARCTION DUE TO UNSPECIFIED OCCLUSION OR STENOSIS OF BILATERAL VERTEBRAL ARTERIES: ICD-10-CM

## 2024-11-25 DIAGNOSIS — G31.84 MILD COGNITIVE IMPAIRMENT, SO STATED: ICD-10-CM

## 2024-11-25 PROCEDURE — G2211 COMPLEX E/M VISIT ADD ON: CPT

## 2024-11-25 PROCEDURE — 99214 OFFICE O/P EST MOD 30 MIN: CPT

## 2025-01-22 ENCOUNTER — APPOINTMENT (OUTPATIENT)
Dept: NEUROLOGY | Facility: CLINIC | Age: 80
End: 2025-01-22
Payer: MEDICARE

## 2025-01-22 VITALS
BODY MASS INDEX: 26.6 KG/M2 | SYSTOLIC BLOOD PRESSURE: 159 MMHG | HEIGHT: 71 IN | WEIGHT: 190 LBS | DIASTOLIC BLOOD PRESSURE: 87 MMHG

## 2025-01-22 DIAGNOSIS — I63.213 CEREBRAL INFARCTION DUE TO UNSPECIFIED OCCLUSION OR STENOSIS OF BILATERAL VERTEBRAL ARTERIES: ICD-10-CM

## 2025-01-22 DIAGNOSIS — G31.84 MILD COGNITIVE IMPAIRMENT, SO STATED: ICD-10-CM

## 2025-01-22 PROCEDURE — G2211 COMPLEX E/M VISIT ADD ON: CPT

## 2025-01-22 PROCEDURE — 99215 OFFICE O/P EST HI 40 MIN: CPT

## 2025-01-28 ENCOUNTER — NON-APPOINTMENT (OUTPATIENT)
Age: 80
End: 2025-01-28

## 2025-07-16 ENCOUNTER — APPOINTMENT (OUTPATIENT)
Dept: NEUROLOGY | Facility: CLINIC | Age: 80
End: 2025-07-16

## 2025-07-16 ENCOUNTER — RX RENEWAL (OUTPATIENT)
Age: 80
End: 2025-07-16

## 2025-07-16 VITALS
HEIGHT: 71 IN | WEIGHT: 185 LBS | SYSTOLIC BLOOD PRESSURE: 148 MMHG | BODY MASS INDEX: 25.9 KG/M2 | DIASTOLIC BLOOD PRESSURE: 75 MMHG

## 2025-07-16 PROCEDURE — G2211 COMPLEX E/M VISIT ADD ON: CPT

## 2025-07-16 PROCEDURE — 99214 OFFICE O/P EST MOD 30 MIN: CPT
